# Patient Record
Sex: MALE | Race: WHITE | NOT HISPANIC OR LATINO | Employment: OTHER | URBAN - METROPOLITAN AREA
[De-identification: names, ages, dates, MRNs, and addresses within clinical notes are randomized per-mention and may not be internally consistent; named-entity substitution may affect disease eponyms.]

---

## 2023-11-07 ENCOUNTER — APPOINTMENT (OUTPATIENT)
Dept: ULTRASOUND IMAGING | Facility: CLINIC | Age: 58
End: 2023-11-07
Attending: FAMILY MEDICINE
Payer: COMMERCIAL

## 2023-11-07 ENCOUNTER — APPOINTMENT (OUTPATIENT)
Dept: CT IMAGING | Facility: CLINIC | Age: 58
End: 2023-11-07
Attending: FAMILY MEDICINE
Payer: COMMERCIAL

## 2023-11-07 ENCOUNTER — HOSPITAL ENCOUNTER (OUTPATIENT)
Facility: CLINIC | Age: 58
Setting detail: OBSERVATION
Discharge: HOME | End: 2023-11-08
Attending: FAMILY MEDICINE | Admitting: STUDENT IN AN ORGANIZED HEALTH CARE EDUCATION/TRAINING PROGRAM
Payer: COMMERCIAL

## 2023-11-07 DIAGNOSIS — I26.99 PULMONARY INFARCTION (H): ICD-10-CM

## 2023-11-07 DIAGNOSIS — Z11.52 ENCOUNTER FOR SCREENING FOR COVID-19: Primary | ICD-10-CM

## 2023-11-07 DIAGNOSIS — I26.99 BILATERAL PULMONARY EMBOLISM (H): ICD-10-CM

## 2023-11-07 PROBLEM — M54.16 LUMBAR RADICULOPATHY: Status: ACTIVE | Noted: 2017-11-15

## 2023-11-07 PROBLEM — K63.5 POLYP OF COLON: Status: ACTIVE | Noted: 2022-06-13

## 2023-11-07 PROBLEM — E78.00 HYPERCHOLESTEREMIA: Status: ACTIVE | Noted: 2023-11-07

## 2023-11-07 PROBLEM — R73.03 PREDIABETES: Status: ACTIVE | Noted: 2018-11-06

## 2023-11-07 PROBLEM — I10 HTN (HYPERTENSION): Status: ACTIVE | Noted: 2023-11-07

## 2023-11-07 PROBLEM — K64.9 HEMORRHOIDS: Status: ACTIVE | Noted: 2022-06-13

## 2023-11-07 LAB
ALBUMIN SERPL BCG-MCNC: 3.9 G/DL (ref 3.5–5.2)
ALP SERPL-CCNC: 72 U/L (ref 40–129)
ALT SERPL W P-5'-P-CCNC: 36 U/L (ref 0–70)
ANION GAP SERPL CALCULATED.3IONS-SCNC: 11 MMOL/L (ref 7–15)
APTT PPP: 23 SECONDS (ref 22–38)
AST SERPL W P-5'-P-CCNC: 20 U/L (ref 0–45)
BASOPHILS # BLD AUTO: 0 10E3/UL (ref 0–0.2)
BASOPHILS NFR BLD AUTO: 0 %
BILIRUB SERPL-MCNC: 0.6 MG/DL
BUN SERPL-MCNC: 20.5 MG/DL (ref 8–23)
CALCIUM SERPL-MCNC: 9.6 MG/DL (ref 8.6–10)
CHLORIDE SERPL-SCNC: 100 MMOL/L (ref 98–107)
CREAT SERPL-MCNC: 1.32 MG/DL (ref 0.67–1.17)
DEPRECATED HCO3 PLAS-SCNC: 24 MMOL/L (ref 22–29)
EGFRCR SERPLBLD CKD-EPI 2021: 62 ML/MIN/1.73M2
EOSINOPHIL # BLD AUTO: 0.1 10E3/UL (ref 0–0.7)
EOSINOPHIL NFR BLD AUTO: 1 %
ERYTHROCYTE [DISTWIDTH] IN BLOOD BY AUTOMATED COUNT: 11.9 % (ref 10–15)
FLUAV RNA SPEC QL NAA+PROBE: NEGATIVE
FLUBV RNA RESP QL NAA+PROBE: NEGATIVE
GLUCOSE SERPL-MCNC: 104 MG/DL (ref 70–99)
HCT VFR BLD AUTO: 42.8 % (ref 40–53)
HGB BLD-MCNC: 14.5 G/DL (ref 13.3–17.7)
HOLD SPECIMEN: NORMAL
IMM GRANULOCYTES # BLD: 0 10E3/UL
IMM GRANULOCYTES NFR BLD: 0 %
INR PPP: 1.13 (ref 0.85–1.15)
LYMPHOCYTES # BLD AUTO: 1.1 10E3/UL (ref 0.8–5.3)
LYMPHOCYTES NFR BLD AUTO: 13 %
MCH RBC QN AUTO: 31.3 PG (ref 26.5–33)
MCHC RBC AUTO-ENTMCNC: 33.9 G/DL (ref 31.5–36.5)
MCV RBC AUTO: 92 FL (ref 78–100)
MONOCYTES # BLD AUTO: 1.2 10E3/UL (ref 0–1.3)
MONOCYTES NFR BLD AUTO: 14 %
NEUTROPHILS # BLD AUTO: 6.6 10E3/UL (ref 1.6–8.3)
NEUTROPHILS NFR BLD AUTO: 72 %
NRBC # BLD AUTO: 0 10E3/UL
NRBC BLD AUTO-RTO: 0 /100
PLATELET # BLD AUTO: 207 10E3/UL (ref 150–450)
POTASSIUM SERPL-SCNC: 4.4 MMOL/L (ref 3.4–5.3)
PROT SERPL-MCNC: 7.8 G/DL (ref 6.4–8.3)
RBC # BLD AUTO: 4.64 10E6/UL (ref 4.4–5.9)
RSV RNA SPEC NAA+PROBE: NEGATIVE
SARS-COV-2 RNA RESP QL NAA+PROBE: NEGATIVE
SODIUM SERPL-SCNC: 135 MMOL/L (ref 135–145)
TROPONIN T SERPL HS-MCNC: 8 NG/L
WBC # BLD AUTO: 9.1 10E3/UL (ref 4–11)

## 2023-11-07 PROCEDURE — 85730 THROMBOPLASTIN TIME PARTIAL: CPT | Performed by: FAMILY MEDICINE

## 2023-11-07 PROCEDURE — 85025 COMPLETE CBC W/AUTO DIFF WBC: CPT | Performed by: FAMILY MEDICINE

## 2023-11-07 PROCEDURE — G0378 HOSPITAL OBSERVATION PER HR: HCPCS

## 2023-11-07 PROCEDURE — 85610 PROTHROMBIN TIME: CPT | Performed by: FAMILY MEDICINE

## 2023-11-07 PROCEDURE — 96360 HYDRATION IV INFUSION INIT: CPT

## 2023-11-07 PROCEDURE — 84484 ASSAY OF TROPONIN QUANT: CPT | Performed by: FAMILY MEDICINE

## 2023-11-07 PROCEDURE — 250N000013 HC RX MED GY IP 250 OP 250 PS 637: Performed by: FAMILY MEDICINE

## 2023-11-07 PROCEDURE — 258N000003 HC RX IP 258 OP 636: Performed by: STUDENT IN AN ORGANIZED HEALTH CARE EDUCATION/TRAINING PROGRAM

## 2023-11-07 PROCEDURE — 93970 EXTREMITY STUDY: CPT

## 2023-11-07 PROCEDURE — 93010 ELECTROCARDIOGRAM REPORT: CPT | Performed by: FAMILY MEDICINE

## 2023-11-07 PROCEDURE — 36415 COLL VENOUS BLD VENIPUNCTURE: CPT | Performed by: FAMILY MEDICINE

## 2023-11-07 PROCEDURE — 99283 EMERGENCY DEPT VISIT LOW MDM: CPT | Mod: 25 | Performed by: FAMILY MEDICINE

## 2023-11-07 PROCEDURE — 87637 SARSCOV2&INF A&B&RSV AMP PRB: CPT | Performed by: FAMILY MEDICINE

## 2023-11-07 PROCEDURE — 99285 EMERGENCY DEPT VISIT HI MDM: CPT | Mod: 25 | Performed by: FAMILY MEDICINE

## 2023-11-07 PROCEDURE — 99222 1ST HOSP IP/OBS MODERATE 55: CPT | Performed by: STUDENT IN AN ORGANIZED HEALTH CARE EDUCATION/TRAINING PROGRAM

## 2023-11-07 PROCEDURE — 250N000011 HC RX IP 250 OP 636: Performed by: FAMILY MEDICINE

## 2023-11-07 PROCEDURE — 93005 ELECTROCARDIOGRAM TRACING: CPT | Performed by: FAMILY MEDICINE

## 2023-11-07 PROCEDURE — 250N000009 HC RX 250: Performed by: FAMILY MEDICINE

## 2023-11-07 PROCEDURE — 96361 HYDRATE IV INFUSION ADD-ON: CPT

## 2023-11-07 PROCEDURE — 71275 CT ANGIOGRAPHY CHEST: CPT

## 2023-11-07 PROCEDURE — 80053 COMPREHEN METABOLIC PANEL: CPT | Performed by: FAMILY MEDICINE

## 2023-11-07 RX ORDER — ONDANSETRON 4 MG/1
4 TABLET, ORALLY DISINTEGRATING ORAL EVERY 6 HOURS PRN
Status: DISCONTINUED | OUTPATIENT
Start: 2023-11-07 | End: 2023-11-07

## 2023-11-07 RX ORDER — HYDROMORPHONE HYDROCHLORIDE 1 MG/ML
.3-.5 INJECTION, SOLUTION INTRAMUSCULAR; INTRAVENOUS; SUBCUTANEOUS
Status: DISCONTINUED | OUTPATIENT
Start: 2023-11-07 | End: 2023-11-08 | Stop reason: HOSPADM

## 2023-11-07 RX ORDER — SODIUM CHLORIDE 9 MG/ML
INJECTION, SOLUTION INTRAVENOUS CONTINUOUS
Status: DISCONTINUED | OUTPATIENT
Start: 2023-11-07 | End: 2023-11-07

## 2023-11-07 RX ORDER — NALOXONE HYDROCHLORIDE 0.4 MG/ML
0.4 INJECTION, SOLUTION INTRAMUSCULAR; INTRAVENOUS; SUBCUTANEOUS
Status: DISCONTINUED | OUTPATIENT
Start: 2023-11-07 | End: 2023-11-08 | Stop reason: HOSPADM

## 2023-11-07 RX ORDER — ACETAMINOPHEN 325 MG/1
650 TABLET ORAL EVERY 6 HOURS PRN
Status: DISCONTINUED | OUTPATIENT
Start: 2023-11-07 | End: 2023-11-08 | Stop reason: HOSPADM

## 2023-11-07 RX ORDER — NALOXONE HYDROCHLORIDE 0.4 MG/ML
0.4 INJECTION, SOLUTION INTRAMUSCULAR; INTRAVENOUS; SUBCUTANEOUS
Status: DISCONTINUED | OUTPATIENT
Start: 2023-11-07 | End: 2023-11-07

## 2023-11-07 RX ORDER — ONDANSETRON 2 MG/ML
4 INJECTION INTRAMUSCULAR; INTRAVENOUS EVERY 6 HOURS PRN
Status: DISCONTINUED | OUTPATIENT
Start: 2023-11-07 | End: 2023-11-07

## 2023-11-07 RX ORDER — MULTIVITAMIN
1 TABLET ORAL DAILY
COMMUNITY
Start: 2022-03-03

## 2023-11-07 RX ORDER — NALOXONE HYDROCHLORIDE 0.4 MG/ML
0.2 INJECTION, SOLUTION INTRAMUSCULAR; INTRAVENOUS; SUBCUTANEOUS
Status: DISCONTINUED | OUTPATIENT
Start: 2023-11-07 | End: 2023-11-07

## 2023-11-07 RX ORDER — SODIUM CHLORIDE, SODIUM LACTATE, POTASSIUM CHLORIDE, CALCIUM CHLORIDE 600; 310; 30; 20 MG/100ML; MG/100ML; MG/100ML; MG/100ML
INJECTION, SOLUTION INTRAVENOUS CONTINUOUS
Status: DISCONTINUED | OUTPATIENT
Start: 2023-11-07 | End: 2023-11-08

## 2023-11-07 RX ORDER — CYCLOBENZAPRINE HCL 10 MG
10 TABLET ORAL 3 TIMES DAILY PRN
COMMUNITY
Start: 2022-03-03

## 2023-11-07 RX ORDER — ONDANSETRON 2 MG/ML
4 INJECTION INTRAMUSCULAR; INTRAVENOUS EVERY 6 HOURS PRN
Status: DISCONTINUED | OUTPATIENT
Start: 2023-11-07 | End: 2023-11-08 | Stop reason: HOSPADM

## 2023-11-07 RX ORDER — OXYCODONE HYDROCHLORIDE 5 MG/1
5 TABLET ORAL
Status: DISCONTINUED | OUTPATIENT
Start: 2023-11-07 | End: 2023-11-08 | Stop reason: HOSPADM

## 2023-11-07 RX ORDER — LISINOPRIL 20 MG/1
1 TABLET ORAL DAILY
COMMUNITY
Start: 2022-06-13

## 2023-11-07 RX ORDER — OXYCODONE HYDROCHLORIDE 5 MG/1
5-10 TABLET ORAL
Status: DISCONTINUED | OUTPATIENT
Start: 2023-11-07 | End: 2023-11-07

## 2023-11-07 RX ORDER — IOPAMIDOL 755 MG/ML
90 INJECTION, SOLUTION INTRAVASCULAR ONCE
Status: COMPLETED | OUTPATIENT
Start: 2023-11-07 | End: 2023-11-07

## 2023-11-07 RX ORDER — NALOXONE HYDROCHLORIDE 0.4 MG/ML
0.2 INJECTION, SOLUTION INTRAMUSCULAR; INTRAVENOUS; SUBCUTANEOUS
Status: DISCONTINUED | OUTPATIENT
Start: 2023-11-07 | End: 2023-11-08 | Stop reason: HOSPADM

## 2023-11-07 RX ORDER — ACETAMINOPHEN 325 MG/1
650 TABLET ORAL EVERY 4 HOURS PRN
Status: DISCONTINUED | OUTPATIENT
Start: 2023-11-07 | End: 2023-11-07

## 2023-11-07 RX ORDER — ONDANSETRON 4 MG/1
4 TABLET, ORALLY DISINTEGRATING ORAL EVERY 6 HOURS PRN
Status: DISCONTINUED | OUTPATIENT
Start: 2023-11-07 | End: 2023-11-08 | Stop reason: HOSPADM

## 2023-11-07 RX ADMIN — SODIUM CHLORIDE 100 ML: 9 INJECTION, SOLUTION INTRAVENOUS at 17:27

## 2023-11-07 RX ADMIN — IOPAMIDOL 90 ML: 755 INJECTION, SOLUTION INTRAVENOUS at 17:27

## 2023-11-07 RX ADMIN — SODIUM CHLORIDE, POTASSIUM CHLORIDE, SODIUM LACTATE AND CALCIUM CHLORIDE: 600; 310; 30; 20 INJECTION, SOLUTION INTRAVENOUS at 21:39

## 2023-11-07 RX ADMIN — APIXABAN 10 MG: 5 TABLET, FILM COATED ORAL at 18:50

## 2023-11-07 ASSESSMENT — ACTIVITIES OF DAILY LIVING (ADL)
ADLS_ACUITY_SCORE: 22
ADLS_ACUITY_SCORE: 35
ADLS_ACUITY_SCORE: 22
ADLS_ACUITY_SCORE: 35

## 2023-11-07 NOTE — ED TRIAGE NOTES
Right back/shoulder pain, started Sat, improved with heating pad, slight cough, coughing makes the pain worse, did cough up small amount of blood; did have wisdom tooth removed Sat.      Triage Assessment (Adult)       Row Name 11/07/23 1247          Triage Assessment    Airway WDL WDL        Cardiac WDL    Cardiac WDL X  right shoulder pain        Cognitive/Neuro/Behavioral WDL    Cognitive/Neuro/Behavioral WDL WDL

## 2023-11-07 NOTE — ED PROVIDER NOTES
History     Chief Complaint   Patient presents with    Shoulder Pain     Right back/shoulder pain, started Sat, improved with heating pad, slight cough, coughing makes the pain worse, did cough up small amount of blood; did have wisdom tooth removed Sat.      HPI  Chano Mott is a 58 year old male, past medical history significant for hypertension, hypercholesterolemia, prediabetes, lumbar radiculopathy, ankylosing spondylitis, presents to the emergency department with concerns of of hemoptysis as well as right periscapular pain and shortness of breath.  History is obtained from the patient presents with his brother.  Last week on Thursday the patient had extraction of a left maxillary molar.  He had a little bit of bleeding around that time but things were quiescent over the weekend and then over the course of late yesterday and today he had some coughing where he brought up a small quantity of blood.  The tooth area did not appear to be bleeding by his own evaluation at home looking in the mirror.  He also notes approximately 48 to 72 hours of right periscapular pain which is sharp worsens with movement but can recall no injury despite the fact that he was working outdoors over the weekend.  Slight cough, nonproductive.  Coughed up a small amount of blood-streaked sputum and feels much more short of breath than usual.  His brother points out that he typically walks 2 miles per day without being short of breath so this is something significant and they are paying attention to it.  Google search indicated some potentially serious problems like pulmonary emboli and so he came to the emergency department.  Patient does have a appointment with primary care provider this coming Friday but when they reviewed his planned visit they felt that he should probably be seen earlier sent him to the emergency department.      Allergies:  Allergies   Allergen Reactions    Pravastatin Headache       Problem List:   "  Patient Active Problem List    Diagnosis Date Noted    HTN (hypertension) 11/07/2023     Priority: Medium    Hypercholesteremia 11/07/2023     Priority: Medium    Pulmonary infarction (H) 11/07/2023     Priority: Medium    Bilateral pulmonary embolism (H) 11/07/2023     Priority: Medium    Polyp of colon 06/13/2022     Priority: Medium    Hemorrhoids 06/13/2022     Priority: Medium    Prediabetes 11/06/2018     Priority: Medium    Lumbar radiculopathy 11/15/2017     Priority: Medium     Formatting of this note might be different from the original. Lumbar radiculopathy left L 5 11-17      Ankylosing spondylitis (H) 01/23/2014     Priority: Medium     Formatting of this note might be different from the original. CURRENT MEDICATION(S) none  No longer seeing rheum.          Past Medical History:    No past medical history on file.    Past Surgical History:    No past surgical history on file.    Family History:    No family history on file.    Social History:  Marital Status:  Single [1]        Medications:    No current outpatient medications on file.        Review of Systems   All other systems reviewed and are negative.      Physical Exam   BP: 121/80  Pulse: (!) 125  Temp: 97.8  F (36.6  C)  Resp: 20  Height: 185.4 cm (6' 1\")  Weight: 100.7 kg (222 lb)  SpO2: 96 %      Physical Exam  Vitals and nursing note reviewed.   Constitutional:       General: He is not in acute distress.     Appearance: Normal appearance. He is normal weight. He is not ill-appearing.   HENT:      Head: Normocephalic and atraumatic.      Right Ear: Tympanic membrane normal.      Left Ear: Tympanic membrane normal.      Nose: Nose normal.      Mouth/Throat:      Mouth: Mucous membranes are dry.      Pharynx: Oropharynx is clear.      Comments: Dry extraction bed approximately tooth #14 area first molar.  Eyes:      Extraocular Movements: Extraocular movements intact.      Conjunctiva/sclera: Conjunctivae normal.      Pupils: Pupils are equal, " round, and reactive to light.   Cardiovascular:      Rate and Rhythm: Normal rate and regular rhythm.      Pulses: Normal pulses.      Heart sounds: Normal heart sounds.   Pulmonary:      Effort: Pulmonary effort is normal.      Breath sounds: Normal breath sounds.   Abdominal:      General: Bowel sounds are normal.      Palpations: Abdomen is soft.   Musculoskeletal:         General: Normal range of motion.      Cervical back: Normal range of motion and neck supple.   Skin:     General: Skin is warm and dry.      Capillary Refill: Capillary refill takes less than 2 seconds.   Neurological:      General: No focal deficit present.      Mental Status: He is alert and oriented to person, place, and time.   Psychiatric:         Mood and Affect: Mood normal.         Behavior: Behavior normal.         ED Course        4:17 PM  Patient was seen and evaluated and course of care discussed.  Certainly differential diagnostic considerations especially with the patient's abnormal vital sign of borderline tachycardia at rest in conjunction with his symptoms historically would warrant consideration for pulmonary emboli.  Clinically stable without hypoxia on room air not tachypneic but borderline tachycardia.  EKG revealed only sinus tachycardia without definite acute ischemia or infarct.  Lab diagnostics are pending.  After discussion with the patient and review of appropriate differential diagnostic considerations I recommended obtaining a CT scan PE study for evaluation of possible pulmonary emboli.  The patient is in agreement.         Procedures              EKG Interpretation:      Interpreted by Stanley Hedrick MD  Time reviewed: Time obtained 1253 time interpreted same sinus tachycardia 109 bpm no acute ST-T wave changes.  Normal axis, normal intervals.  Impression sinus tachycardia.         Results for orders placed or performed during the hospital encounter of 11/07/23 (from the past 24 hour(s))   CBC with  platelets, differential    Narrative    The following orders were created for panel order CBC with platelets, differential.  Procedure                               Abnormality         Status                     ---------                               -----------         ------                     CBC with platelets and d...[[id number]]                      Final result                 Please view results for these tests on the individual orders.   Comprehensive metabolic panel   Result Value Ref Range    Sodium 135 135 - 145 mmol/L    Potassium 4.4 3.4 - 5.3 mmol/L    Carbon Dioxide (CO2) 24 22 - 29 mmol/L    Anion Gap 11 7 - 15 mmol/L    Urea Nitrogen 20.5 8.0 - 23.0 mg/dL    Creatinine 1.32 (H) 0.67 - 1.17 mg/dL    GFR Estimate 62 >60 mL/min/1.73m2    Calcium 9.6 8.6 - 10.0 mg/dL    Chloride 100 98 - 107 mmol/L    Glucose 104 (H) 70 - 99 mg/dL    Alkaline Phosphatase 72 40 - 129 U/L    AST 20 0 - 45 U/L    ALT 36 0 - 70 U/L    Protein Total 7.8 6.4 - 8.3 g/dL    Albumin 3.9 3.5 - 5.2 g/dL    Bilirubin Total 0.6 <=1.2 mg/dL   Troponin T, High Sensitivity   Result Value Ref Range    Troponin T, High Sensitivity 8 <=22 ng/L   [facility]    Narrative    The following orders were created for panel order [facility].  Procedure                               Abnormality         Status                     ---------                               -----------         ------                     Extra Blue Top Tube[[id number]]                              Final result                 Please view results for these tests on the individual orders.   CBC with platelets and differential   Result Value Ref Range    WBC Count 9.1 4.0 - 11.0 10e3/uL    RBC Count 4.64 4.40 - 5.90 10e6/uL    Hemoglobin 14.5 13.3 - 17.7 g/dL    Hematocrit 42.8 40.0 - 53.0 %    MCV 92 78 - 100 fL    MCH 31.3 26.5 - 33.0 pg    MCHC 33.9 31.5 - 36.5 g/dL    RDW 11.9 10.0 - 15.0 %    Platelet Count 207 150 - 450 10e3/uL    % Neutrophils 72 %    %  Lymphocytes 13 %    % Monocytes 14 %    % Eosinophils 1 %    % Basophils 0 %    % Immature Granulocytes 0 %    NRBCs per 100 WBC 0 <1 /100    Absolute Neutrophils 6.6 1.6 - 8.3 10e3/uL    Absolute Lymphocytes 1.1 0.8 - 5.3 10e3/uL    Absolute Monocytes 1.2 0.0 - 1.3 10e3/uL    Absolute Eosinophils 0.1 0.0 - 0.7 10e3/uL    Absolute Basophils 0.0 0.0 - 0.2 10e3/uL    Absolute Immature Granulocytes 0.0 <=0.4 10e3/uL    Absolute NRBCs 0.0 10e3/uL   Extra Blue Top Tube   Result Value Ref Range    Hold Specimen JIC    INR   Result Value Ref Range    INR 1.13 0.85 - 1.15   CT Chest Pulmonary Embolism w Contrast    Narrative    EXAM: CT CHEST PULMONARY EMBOLISM W CONTRAST  LOCATION: [facility]  DATE: 11/7/2023    INDICATION: Pain. Shortness of breath, right periscapular pain, recent surgical procedure, rule out pulmonary embolism.  COMPARISON: None.  TECHNIQUE: CT chest pulmonary angiogram during arterial phase injection of IV contrast. Multiplanar reformats and MIP reconstructions were performed. Dose reduction techniques were used.   CONTRAST: 93 ml Isovue 370    FINDINGS:  ANGIOGRAM CHEST: Multiple bilateral pulmonary emboli with occlusive embolus within the posterior right upper lobe with large evolving infarct. This also infarcts in the inferior left upper lobe. Multiple emboli in the right lower lobe, including the   right lower lobe pulmonary artery. No right heart strain. Thoracic aorta negative for dissection or aneurysm.    LUNGS AND PLEURA: Large subpleural ground-glass infiltrate right upper lobe compatible with evolving infarct. Ground-glass and solid subpleural infiltrate in the inferomedial left upper lobe compatible with infarct. Tiny effusions. Elevation right   hemidiaphragm.    MEDIASTINUM/AXILLAE: Moderate.    CORONARY ARTERY CALCIFICATION: Moderate.    UPPER ABDOMEN: A few visualized simple cysts left kidney which do not warrant follow-up.    MUSCULOSKELETAL: Normal.       Impression    IMPRESSION:  1.  Acute bilateral pulmonary embolism. Occlusive embolism within the segmental pulmonary arterial branch in the right upper lobe with large evolving infarct within the posterior right upper lobe. There is also evidence of embolism within a subsegmental   pulmonary arterial branch within the inferior left upper lobe with smaller evolving subpleural infarct. Multiple emboli within the right lower lobe including the right lower lobe pulmonary artery. Small amount of embolism seen in the right upper lobe   pulmonary artery extends into the right pulmonary artery.    2.  Results discussed with Dr. Stanley Hedrick.    NOTE: ABNORMAL REPORT    1.  THE DICTATION ABOVE DESCRIBES AN ABNORMALITY FOR WHICH FOLLOW-UP IS NEEDED.    6:07 PM  Reviewed lab diagnostics and imaging in the room with the patient and his brother.  Extensive bilateral PE worse on the right than the left.  No right heart strain noted per radiology.  The patient remains borderline tachycardic but is otherwise vitally stable and not requiring oxygen at this point.  I discussed possible disposition options with he and his brother.  I would like to have ultrasound of bilateral lower extremities to assess for clot burden and I also recommended preferentially that the patient be admitted to observation status.  He reluctantly agrees and I plan to discuss the patient with the on-call hospitalist.    6:26 PM  I reviewed this patient with on-call hospitalist Dr. Jordan Shoemaker who agreed to accept the care of the patient to observation status.  DOAC, apixaban.  Agrees with bilateral ultrasound lower extremities for evaluation of clot burden.  Transition orders placed.    Medications   sodium chloride 0.9% BOLUS 1,000 mL (0 mLs Intravenous Hold 11/7/23 1800)   apixaban ANTICOAGULANT (ELIQUIS) tablet 10 mg (has no administration in time range)     Followed by   apixaban ANTICOAGULANT (ELIQUIS) tablet 5 mg (has no administration in time range)    iopamidol (ISOVUE-370) solution 90 mL (90 mLs Intravenous $Given 11/7/23 1727)   sodium chloride 0.9 % bag 500mL for CT scan flush use (100 mLs Intravenous $Given 11/7/23 1727)       Assessments & Plan (with Medical Decision Making)   58-year-old male past medical history reviewed as above who presents after dental procedure and subsequent development of right periscapular pain and shortness of breath as discussed in the HPI documented on exam.  The patient has resting tachycardia but otherwise normal vital signs including oxygen saturation and respiratory rate at rest.  Parenteral diagnostic consideration certainly includes the possibility of pulmonary emboli in addition to musculoskeletal etiology, atypical pneumonia, pneumothorax, thoracic dissection, ACS, GI etiology pain.  CT scan of the patient's chest revealed acute extensive bilateral pulmonary embolism without evidence of right heart strain.  Pulmonary infarction present.  Still pending at the time of this dictation are bilateral lower extremity ultrasound and COVID/RSV/influenza testing.  The patient is reviewed as noted the time stamp above with the on-call hospitalist will plan on placing the patient on oral DOAC therapy.  Disclaimer: This note consists of symbols derived from keyboarding, dictation and/or voice recognition software. As a result, there may be errors in the script that have gone undetected. Please consider this when interpreting information found in this chart.      I have reviewed the nursing notes.    I have reviewed the findings, diagnosis, plan and need for follow up with the patient.        New Prescriptions    No medications on file       Final diagnoses:   Bilateral pulmonary embolism (H)   Pulmonary infarction (H)       11/7/2023   [facility] EMERGENCY DEPT       Stanley Hedrick MD  11/07/23 0454

## 2023-11-08 ENCOUNTER — APPOINTMENT (OUTPATIENT)
Dept: CARDIOLOGY | Facility: CLINIC | Age: 58
End: 2023-11-08
Attending: STUDENT IN AN ORGANIZED HEALTH CARE EDUCATION/TRAINING PROGRAM
Payer: COMMERCIAL

## 2023-11-08 VITALS
BODY MASS INDEX: 29.01 KG/M2 | TEMPERATURE: 99.9 F | HEART RATE: 105 BPM | HEIGHT: 73 IN | WEIGHT: 218.92 LBS | RESPIRATION RATE: 16 BRPM | SYSTOLIC BLOOD PRESSURE: 124 MMHG | OXYGEN SATURATION: 92 % | DIASTOLIC BLOOD PRESSURE: 78 MMHG

## 2023-11-08 LAB
ANION GAP SERPL CALCULATED.3IONS-SCNC: 11 MMOL/L (ref 7–15)
BUN SERPL-MCNC: 19 MG/DL (ref 8–23)
CALCIUM SERPL-MCNC: 9.2 MG/DL (ref 8.6–10)
CHLORIDE SERPL-SCNC: 100 MMOL/L (ref 98–107)
CREAT SERPL-MCNC: 0.98 MG/DL (ref 0.67–1.17)
DEPRECATED HCO3 PLAS-SCNC: 21 MMOL/L (ref 22–29)
EGFRCR SERPLBLD CKD-EPI 2021: 89 ML/MIN/1.73M2
ERYTHROCYTE [DISTWIDTH] IN BLOOD BY AUTOMATED COUNT: 11.9 % (ref 10–15)
GLUCOSE SERPL-MCNC: 99 MG/DL (ref 70–99)
HCT VFR BLD AUTO: 40.4 % (ref 40–53)
HGB BLD-MCNC: 13.4 G/DL (ref 13.3–17.7)
LVEF ECHO: NORMAL
MCH RBC QN AUTO: 30.8 PG (ref 26.5–33)
MCHC RBC AUTO-ENTMCNC: 33.2 G/DL (ref 31.5–36.5)
MCV RBC AUTO: 93 FL (ref 78–100)
PLATELET # BLD AUTO: 215 10E3/UL (ref 150–450)
POTASSIUM SERPL-SCNC: 4.1 MMOL/L (ref 3.4–5.3)
RBC # BLD AUTO: 4.35 10E6/UL (ref 4.4–5.9)
SODIUM SERPL-SCNC: 132 MMOL/L (ref 135–145)
WBC # BLD AUTO: 9.3 10E3/UL (ref 4–11)

## 2023-11-08 PROCEDURE — 258N000003 HC RX IP 258 OP 636: Performed by: STUDENT IN AN ORGANIZED HEALTH CARE EDUCATION/TRAINING PROGRAM

## 2023-11-08 PROCEDURE — 999N000208 ECHOCARDIOGRAM COMPLETE

## 2023-11-08 PROCEDURE — 99239 HOSP IP/OBS DSCHRG MGMT >30: CPT | Performed by: HOSPITALIST

## 2023-11-08 PROCEDURE — 36415 COLL VENOUS BLD VENIPUNCTURE: CPT | Performed by: STUDENT IN AN ORGANIZED HEALTH CARE EDUCATION/TRAINING PROGRAM

## 2023-11-08 PROCEDURE — 93306 TTE W/DOPPLER COMPLETE: CPT | Mod: 26 | Performed by: INTERNAL MEDICINE

## 2023-11-08 PROCEDURE — 96361 HYDRATE IV INFUSION ADD-ON: CPT

## 2023-11-08 PROCEDURE — 80048 BASIC METABOLIC PNL TOTAL CA: CPT | Performed by: STUDENT IN AN ORGANIZED HEALTH CARE EDUCATION/TRAINING PROGRAM

## 2023-11-08 PROCEDURE — G0378 HOSPITAL OBSERVATION PER HR: HCPCS

## 2023-11-08 PROCEDURE — 85027 COMPLETE CBC AUTOMATED: CPT | Performed by: STUDENT IN AN ORGANIZED HEALTH CARE EDUCATION/TRAINING PROGRAM

## 2023-11-08 PROCEDURE — 255N000002 HC RX 255 OP 636: Performed by: HOSPITALIST

## 2023-11-08 PROCEDURE — 250N000013 HC RX MED GY IP 250 OP 250 PS 637: Performed by: STUDENT IN AN ORGANIZED HEALTH CARE EDUCATION/TRAINING PROGRAM

## 2023-11-08 RX ADMIN — APIXABAN 10 MG: 5 TABLET, FILM COATED ORAL at 08:06

## 2023-11-08 RX ADMIN — HUMAN ALBUMIN MICROSPHERES AND PERFLUTREN 2 ML: 10; .22 INJECTION, SOLUTION INTRAVENOUS at 14:19

## 2023-11-08 RX ADMIN — SODIUM CHLORIDE, POTASSIUM CHLORIDE, SODIUM LACTATE AND CALCIUM CHLORIDE: 600; 310; 30; 20 INJECTION, SOLUTION INTRAVENOUS at 08:07

## 2023-11-08 ASSESSMENT — ACTIVITIES OF DAILY LIVING (ADL)
ADLS_ACUITY_SCORE: 22

## 2023-11-08 NOTE — DISCHARGE INSTRUCTIONS
Apixaban (By mouth)  Apixaban (a-PIX-a-ban)    Treats and prevents blood clots. This medicine is a blood thinner.    Brand Name(s):Eliquis,Eliquis 30 Day DVT/PE Starter Pack  There may be other brand names for this medicine.    When This Medicine Should Not Be Used:  This medicine is not right for everyone. Do not use it if you had an allergic reaction to apixaban or you have active bleeding.    How to Use This Medicine:  Tablet  Your doctor will tell you how much medicine to use. Do not use more than directed.   If you are not able to swallow the tablets whole, they may be crushed and mixed in water, 5% dextrose in water (D5W), apple juice, or applesauce. The crushed tablets may be mixed with 60 mL of water or D5W dose and given through a nasogastric tube (NGT).  This medicine should come with a Medication Guide. Ask your pharmacist for a copy if you do not have one.   Missed dose: Take a dose as soon as you remember. If it is almost time for your next dose, wait until then and take a regular dose. Do not take extra medicine to make up for a missed dose.   Store the medicine in a closed container at room temperature, away from heat, moisture, and direct light.     Drugs and Foods to Avoid:  Ask your doctor or pharmacist before using any other medicine, including over-the-counter medicines, vitamins, and herbal products.  Some medicines can affect how apixaban works. Tell your doctor if you are using any of the following:   Carbamazepine, itraconazole, ketoconazole, phenytoin, rifampin, ritonavir, Ghada's wort  Blood thinner (including clopidogrel, heparin, prasugrel, warfarin)  Medicine to treat depression  NSAID pain or arthritis medicine (including aspirin, celecoxib, diclofenac, ibuprofen, naproxen)    Warnings While Using This Medicine:  Tell your doctor if you are pregnant or breastfeeding, or if you have kidney disease, liver disease, bleeding problems, antiphospholipid syndrome, or an artificial heart  valve.  Do not stop using this medicine suddenly without asking your doctor. You might have a higher risk of stroke for a short time after you stop using this medicine.  This medicine increases your risk for bleeding that can become serious if not controlled. You may also bruise easily, and it may take longer than usual for bleeding to stop.  This medicine may increase your risk for a hematoma (blood clot) in your spine or back if you undergo an epidural or spinal puncture. This could lead to paralysis. Tell your doctor if you ever had spine problems or back surgery.  Tell any doctor or dentist who treats you that you are using this medicine. With your doctor's supervision, you may need to stop using this medicine several days before you have surgery or medical tests.  Your doctor will do lab tests at regular visits to check on the effects of this medicine. Keep all appointments.   Keep all medicine out of the reach of children. Never share your medicine with anyone.     Possible Side Effects While Using This Medicine:  Call your doctor right away if you notice any of these side effects:  Allergic reaction: Itching or hives, swelling in your face or hands, swelling or tingling in your mouth or throat, chest tightness, trouble breathing  Change in how much or how often you urinate, red or pink urine  Chest pain, trouble breathing  Coughing up blood, vomiting blood or material that looks like coffee grounds  Numbness, tingling, or muscle weakness in your legs or feet  Red or black, tarry stools  Unusual bleeding, bruising, or weakness    If you notice other side effects that you think are caused by this medicine, tell your doctor.  Call your doctor for medical advice about side effects. You may report side effects to FDA at 4-036-FDA-1433

## 2023-11-08 NOTE — PROGRESS NOTES
[facility] DISCHARGE NOTE    Patient discharged to home at 4:08 PM via wheel chair. Accompanied by brother and staff. Discharge instructions reviewed with patient and sibling, opportunity offered to ask questions. Prescriptions sent to patients preferred pharmacy. All belongings sent with patient.    Ivan Vanessa RN

## 2023-11-08 NOTE — DISCHARGE SUMMARY
Internal Medicine Discharge Summary     Name: Chano Mott  YOB: 1965 (Age: 58 year old)  [facility]  Admitting physician: Jordan Shoemaker DO   Discharging Physician: Valencia Han MD   Date of Admission: 11/7/2023  Date of Discharge: 11/8/2023     Primary Discharge Diagnosis:      Acute unprovoked b/l pulmonary embolism and LLE DVT    HPI (per admitting physician):      Chano Mott is a 58 year old male who has past medical history of hypertension, hyperlipidemia, lumbar radiculopathy, and ankylosing spondylitis who presents with new onset hemoptysis, shortness of breath, and right shoulder pain.  He recently underwent a molar extraction 11/2 which healed without any concerns, but then patient noticed that he was still coughing up blood which prompted him to be concerned.  Patient also noted that he had right scapular pain which was sharp and worse with exertion.  Patient noted that he started to have a cough that made the pain worse and after quick Google search decided that it was time to be evaluated in the ED.  Of note, patient is fairly active at baseline without any comorbidities.  Patient became more concerned when he was short of breath, because this is brand-new and he typically has pretty good exercise tolerance.  He denies any smoking, call, or illicit drug use.      Hospital Course by Main Problems:      Acute, unprovoked b/l pulmonary embolism, LLE DVT: Patient p/w SOB, right shoulder pain and small volume hemoptysis. CTA chest showed acute b/l pulmonary embolism, occlusive embolism within the segmental pulmonary arterial branch in the right upper lobe with large evolving infarct within the posterior right upper lobe, e/o subsegmental pulmonary arterial branch within the inferior left upper lobe with smaller evolving subpleural infarct, multiple emboli within the right lower lobe including the right lower lobe pulmonary artery, small amount of  "embolism seen in the right upper lobe pulmonary artery extends into the right pulmonary artery. PVL b/l LE showed a partially occlusive DVT in the left popliteal vein. Echo showed preserved EF, no WMA, normal RV size and function, and no hemodynamically significant valvular disease. EKG showed sinus tachycardia with non-specific TW changes. Troponin was negative. COVID-19, RSV, influenza neg. Patient denied recent surgery (apart from dental extraction which would not qualify patient's thromboembolism as provoked) or long haul travel, and he denied personal or family h/o thromboembolism. The patient was started on eliquis on admission and he will need a minimum of 3 month course of AC. He reported that he is UTD on his routine cancer screening, but was asked to confirm this at his PCP follow up appointment. The patient needs hypercoagulable labs at his PCP follow up.     Minimal hemoptysis: Patient reported coughing up very small volume of blood which is 2/2 PE as above with suspected associated pulmonary infarction. The patient had no significant hemoptysis during this admission and his H/H was WNL.     GERRY: Likely 2/2 recent NSAID use at home for shoulder pain with PTA lisinopril potentially contributing. The patient's lisinopril was held on admission and he was given IVF with normalization of his renal function.    Hyponatremia: Mild with na 132. Patient asymptomatic.    HTN: Cont lisinopril    HLD: Patient is intolerant to statins    Discharge Exam:     /78   Pulse 105   Temp 99.9  F (37.7  C) (Oral)   Resp 16   Ht 1.854 m (6' 1\")   Wt 99.3 kg (218 lb 14.7 oz)   SpO2 92%   BMI 28.88 kg/m    Gen: A+Ox3, no acute distress  HEENT: No scleral icterus, no conjunctival pallor, no JVD  CVS: RRR, clear S1S2, no murmurs  Resp: CTA all fields b/l, no wheeze, rales or rhonchi  Abdomen: Normal bowel sounds, soft, no tenderness to palpation  Extremities: No lower extremity edema  Neuology: CN II - XII grossly " intact, no focal deficits    Discharge/Recent Laboratory Results:     Results for orders placed or performed during the hospital encounter of 11/07/23 (from the past 24 hour(s))   CBC with platelets, differential    Narrative    The following orders were created for panel order CBC with platelets, differential.  Procedure                               Abnormality         Status                     ---------                               -----------         ------                     CBC with platelets and d...[[id number]]                      Final result                 Please view results for these tests on the individual orders.   Comprehensive metabolic panel   Result Value Ref Range    Sodium 135 135 - 145 mmol/L    Potassium 4.4 3.4 - 5.3 mmol/L    Carbon Dioxide (CO2) 24 22 - 29 mmol/L    Anion Gap 11 7 - 15 mmol/L    Urea Nitrogen 20.5 8.0 - 23.0 mg/dL    Creatinine 1.32 (H) 0.67 - 1.17 mg/dL    GFR Estimate 62 >60 mL/min/1.73m2    Calcium 9.6 8.6 - 10.0 mg/dL    Chloride 100 98 - 107 mmol/L    Glucose 104 (H) 70 - 99 mg/dL    Alkaline Phosphatase 72 40 - 129 U/L    AST 20 0 - 45 U/L    ALT 36 0 - 70 U/L    Protein Total 7.8 6.4 - 8.3 g/dL    Albumin 3.9 3.5 - 5.2 g/dL    Bilirubin Total 0.6 <=1.2 mg/dL   Troponin T, High Sensitivity   Result Value Ref Range    Troponin T, High Sensitivity 8 <=22 ng/L   [place] Draw    Narrative    The following orders were created for panel order [place] Draw.  Procedure                               Abnormality         Status                     ---------                               -----------         ------                     Extra Blue Top Tube[[id number]]                              Final result                 Please view results for these tests on the individual orders.   CBC with platelets and differential   Result Value Ref Range    WBC Count 9.1 4.0 - 11.0 10e3/uL    RBC Count 4.64 4.40 - 5.90 10e6/uL    Hemoglobin 14.5 13.3 - 17.7 g/dL    Hematocrit 42.8 40.0 -  53.0 %    MCV 92 78 - 100 fL    MCH 31.3 26.5 - 33.0 pg    MCHC 33.9 31.5 - 36.5 g/dL    RDW 11.9 10.0 - 15.0 %    Platelet Count 207 150 - 450 10e3/uL    % Neutrophils 72 %    % Lymphocytes 13 %    % Monocytes 14 %    % Eosinophils 1 %    % Basophils 0 %    % Immature Granulocytes 0 %    NRBCs per 100 WBC 0 <1 /100    Absolute Neutrophils 6.6 1.6 - 8.3 10e3/uL    Absolute Lymphocytes 1.1 0.8 - 5.3 10e3/uL    Absolute Monocytes 1.2 0.0 - 1.3 10e3/uL    Absolute Eosinophils 0.1 0.0 - 0.7 10e3/uL    Absolute Basophils 0.0 0.0 - 0.2 10e3/uL    Absolute Immature Granulocytes 0.0 <=0.4 10e3/uL    Absolute NRBCs 0.0 10e3/uL   Extra Blue Top Tube   Result Value Ref Range    Hold Specimen JIC    INR   Result Value Ref Range    INR 1.13 0.85 - 1.15   CT Chest Pulmonary Embolism w Contrast    Narrative    EXAM: CT CHEST PULMONARY EMBOLISM W CONTRAST  LOCATION: [facility]  DATE: 11/7/2023    INDICATION: Pain. Shortness of breath, right periscapular pain, recent surgical procedure, rule out pulmonary embolism.  COMPARISON: None.  TECHNIQUE: CT chest pulmonary angiogram during arterial phase injection of IV contrast. Multiplanar reformats and MIP reconstructions were performed. Dose reduction techniques were used.   CONTRAST: 93 ml Isovue 370    FINDINGS:  ANGIOGRAM CHEST: Multiple bilateral pulmonary emboli with occlusive embolus within the posterior right upper lobe with large evolving infarct. This also infarcts in the inferior left upper lobe. Multiple emboli in the right lower lobe, including the   right lower lobe pulmonary artery. No right heart strain. Thoracic aorta negative for dissection or aneurysm.    LUNGS AND PLEURA: Large subpleural ground-glass infiltrate right upper lobe compatible with evolving infarct. Ground-glass and solid subpleural infiltrate in the inferomedial left upper lobe compatible with infarct. Tiny effusions. Elevation right   hemidiaphragm.    MEDIASTINUM/AXILLAE:  Moderate.    CORONARY ARTERY CALCIFICATION: Moderate.    UPPER ABDOMEN: A few visualized simple cysts left kidney which do not warrant follow-up.    MUSCULOSKELETAL: Normal.      Impression    IMPRESSION:  1.  Acute bilateral pulmonary embolism. Occlusive embolism within the segmental pulmonary arterial branch in the right upper lobe with large evolving infarct within the posterior right upper lobe. There is also evidence of embolism within a subsegmental   pulmonary arterial branch within the inferior left upper lobe with smaller evolving subpleural infarct. Multiple emboli within the right lower lobe including the right lower lobe pulmonary artery. Small amount of embolism seen in the right upper lobe   pulmonary artery extends into the right pulmonary artery.    2.  Results discussed with Dr. Stanley Hedrick.    NOTE: ABNORMAL REPORT    1.  THE DICTATION ABOVE DESCRIBES AN ABNORMALITY FOR WHICH FOLLOW-UP IS NEEDED.    PTT   Result Value Ref Range    aPTT 23 22 - 38 Seconds   Symptomatic Influenza A/B, RSV, & SARS-CoV2 PCR (COVID-19) Nasopharyngeal    Specimen: Nasopharyngeal; Swab   Result Value Ref Range    Influenza A PCR Negative Negative    Influenza B PCR Negative Negative    RSV PCR Negative Negative    SARS CoV2 PCR Negative Negative    Narrative    Testing was performed using the Xpert Xpress CoV2/Flu/RSV Assay on the [employer] GeneXpert Instrument. This test should be ordered for the detection of SARS-CoV-2, influenza, and RSV viruses in individuals who meet clinical and/or epidemiological criteria. Test performance is unknown in asymptomatic patients. This test is for in vitro diagnostic use under the FDA EUA for laboratories certified under CLIA to perform high or moderate complexity testing. This test has not been FDA cleared or approved. A negative result does not rule out the presence of PCR inhibitors in the specimen or target RNA in concentration below the limit of detection for the assay. If only one  viral target is positive but coinfection with multiple targets is suspected, the sample should be re-tested with another FDA cleared, approved, or authorized test, if coinfection would change clinical management. This test was validated by the [facility]. These laboratories are certified under the Clinical Laboratory Improvement Amendments of 1988 (CLIA-88) as qualified to perform high complexity laboratory testing.   US Lower Extremity Venous Duplex Bilateral    Narrative    EXAM: US LOWER EXTREMITY VENOUS DUPLEX BILATERAL  LOCATION: [facility]  DATE: 11/7/2023    INDICATION: extensive bilateral PE with pulmonary infarct.  Evaluate clot burden  COMPARISON: None.  TECHNIQUE: Venous Duplex ultrasound of bilateral lower extremities with and without compression, augmentation and duplex. Color flow and spectral Doppler with waveform analysis performed.    FINDINGS: Exam includes the common femoral, femoral, popliteal veins as well as segmentally visualized deep calf veins and greater saphenous vein.     RIGHT: No deep vein thrombosis. No superficial thrombophlebitis. No popliteal cyst.    LEFT: There is partially occlusive DVT in the left popliteal vein. No superficial thrombophlebitis. No popliteal cyst.      Impression    IMPRESSION:  1.  Partially occlusive DVT in the left popliteal vein.   Basic metabolic panel   Result Value Ref Range    Sodium 132 (L) 135 - 145 mmol/L    Potassium 4.1 3.4 - 5.3 mmol/L    Chloride 100 98 - 107 mmol/L    Carbon Dioxide (CO2) 21 (L) 22 - 29 mmol/L    Anion Gap 11 7 - 15 mmol/L    Urea Nitrogen 19.0 8.0 - 23.0 mg/dL    Creatinine 0.98 0.67 - 1.17 mg/dL    GFR Estimate 89 >60 mL/min/1.73m2    Calcium 9.2 8.6 - 10.0 mg/dL    Glucose 99 70 - 99 mg/dL   CBC with platelets   Result Value Ref Range    WBC Count 9.3 4.0 - 11.0 10e3/uL    RBC Count 4.35 (L) 4.40 - 5.90 10e6/uL    Hemoglobin 13.4 13.3 - 17.7 g/dL    Hematocrit 40.4 40.0 - 53.0 %     MCV 93 78 - 100 fL    MCH 30.8 26.5 - 33.0 pg    MCHC 33.2 31.5 - 36.5 g/dL    RDW 11.9 10.0 - 15.0 %    Platelet Count 215 150 - 450 10e3/uL   Echocardiogram Complete   Result Value Ref Range    LVEF  60-65%     Narrative    [id number]  [id number]  [id number]  [id number]^BRIAN^LIZET     [facility]  Echocardiography Laboratory  [address]  [place] [id number]     Name: DREAD MANDUJANO  MRN: [id number]  : 1965  Study Date: 2023 01:44 PM  Age: 58 yrs  Gender: Male  Patient Location: [facility]  Reason For Study: Syncope  Ordering Physician: LIZET TORRES  Referring Physician: Hermann Hawthorne  Performed By: Nahomi Randall RDCS     BSA: 2.2 m2  Height: 73 in  Weight: 218 lb  HR: 105  BP: 121/75 mmHg  ______________________________________________________________________________  Procedure  Complete Echo Adult. Optison (NDC #[id number]) given intravenously.  ______________________________________________________________________________  Interpretation Summary     1. Normal left ventricular size and function. Left ventricular ejection  fraction of 60-65%.  2. No segmental wall motion abnormalities noted.  3. RV is poorly seen but appears mildly dilated on limited views.  4. No hemodynamically significant valvular disease.  No prior study for comparison.  ______________________________________________________________________________  Left Ventricle  The left ventricle is normal in size. There is normal left ventricular wall  thickness. Left ventricular systolic function is normal. Grade I or early  diastolic dysfunction. The visual ejection fraction is 60-65%. No regional  wall motion abnormalities noted.     Right Ventricle  The right ventricle is normal in size and function.     Atria  Normal left atrial size. Right atrial size is normal. There is no color  Doppler evidence of an atrial shunt.     Mitral Valve  The mitral valve leaflets appear normal. There is no evidence of  stenosis,  fluttering, or prolapse. There is trace mitral regurgitation. There is no  mitral valve stenosis.     Tricuspid Valve  Normal tricuspid valve. There is trace tricuspid regurgitation.     Aortic Valve  The aortic valve is trileaflet. No aortic regurgitation is present. No aortic  stenosis is present.     Pulmonic Valve  The pulmonic valve is not well visualized. There is trace pulmonic valvular  regurgitation.     Vessels  The aortic root is normal size. Normal size ascending aorta. The inferior vena  cava is normal.     Pericardium  There is no pericardial effusion.     Rhythm  Sinus rhythm was noted.  ______________________________________________________________________________  MMode/2D Measurements & Calculations  IVSd: 0.94 cm     LVIDd: 4.7 cm  LVIDs: 2.9 cm  LVPWd: 0.95 cm  FS: 38.0 %  LV mass(C)d: 153.3 grams  LV mass(C)dI: 68.7 grams/m2  Ao root diam: 3.7 cm  LA dimension: 3.6 cm  asc Aorta Diam: 3.6 cm  LA/Ao: 0.97  Ao root diam index Ht(cm/m): 2.0  Ao root diam index BSA (cm/m2): 1.7  Asc Ao diam index BSA (cm/m2): 1.6  Asc Ao diam index Ht(cm/m): 1.9  LA Volume (BP): 29.8 ml     LA Volume Index (BP): 13.4 ml/m2  RV Base: 2.0 cm  RWT: 0.40  TAPSE: 1.9 cm     Doppler Measurements & Calculations  MV E max alexander: 58.5 cm/sec  MV A max alexander: 73.2 cm/sec  MV E/A: 0.80  MV dec time: 0.15 sec  PA acc time: 0.09 sec  E/E' av.9  Lateral E/e': 6.4  Medial E/e': 7.5  RV S Alexander: 15.7 cm/sec     ______________________________________________________________________________  Report approved by: Isak Abreu 2023 02:39 PM               Condition on Discharge:   -Good    Discharge Medications:      Current Discharge Medication List        START taking these medications    Details   apixaban ANTICOAGULANT (ELIQUIS) 5 MG tablet Take 2 tablets (10 mg) by mouth 2 times daily for 6 days, THEN 1 tablet (5 mg) 2 times daily for 90 days.  Qty: 204 tablet, Refills: 0    Associated Diagnoses: Bilateral  pulmonary embolism (H); Pulmonary infarction (H)           CONTINUE these medications which have NOT CHANGED    Details   cyclobenzaprine (FLEXERIL) 10 MG tablet Take 10 mg by mouth 3 times daily as needed for muscle spasms      lisinopril (ZESTRIL) 20 MG tablet Take 1 tablet by mouth daily      Multiple Vitamin (ONE-A-DAY ESSENTIAL) TABS Take 1 tablet by mouth daily             Discharge Instructions:        Reason for your hospital stay    Clot in your left leg and lungs     Follow-up and recommended labs and tests     Follow up with your PCP as you are scheduled this week. You will need labs done called hypercoagulable panel at your PCP appointment. Please also discuss whether or not you need any additional testing as part of routine cancer screening with your PCP at your follow up appointment.     Activity    Your activity upon discharge: activity as tolerated     Discharge Instructions    Please ensure that all medications are taken as prescribed. Please follow the instructions on the eliquis very closely as the dose changes over time. Please avoid taking any over the counter pain medications in the class called NSAIDs (ibuprofen, aleve, motrin, naproxen, advil, aspirin, etc) as these can increase your risk of bleeding if taken with eliquis. Tylenol is okay to take if needed for pain/fever. You will need to be on eliquis for a minimum of 3 months, but this may change based on the labs that your PCP will need to send to see if you have an underlying clotting condition. If you develop intractable pain / vomiting, shortness of breath, see blood in your stool, pass black stool, or if you have any other concerning symptoms, please seek medical attention.     Diet    Follow this diet upon discharge: Cardiac (low fat, low salt)        Dispo:   -Home    Recommended PCP Follow Up:   -Needs hypercoagulable labs  -Please ensure patient is UTD on his routine cancer screening      Signed:     Valencia Han MD  11/8/2023  3:08 PM      34 minutes was spent evaluating the patient, writing orders, discharge planning, and providing patient discharge education and counseling in preparation for discharge.

## 2023-11-08 NOTE — MEDICATION SCRIBE - ADMISSION MEDICATION HISTORY
Medication Scribe Admission Medication History    Admission medication history is complete. The information provided in this note is only as accurate as the sources available at the time of the update.    Information Source(s): Patient and CareEverywhere/SureScripts via  in room with patient and finished at desk.    Pertinent Information: All meds on PTA med list are new to chart note.    Had Cyclobenzaprine leftover at home from previous injury.  Was on Pravastatin in the past.  States that he has tried a few different ones but they have all given him a headache.    Changes made to PTA medication list:  Added: Cyclobenzaprine, Lisinopril 20 mg, MVI.  Deleted: None  Changed: None    Medication Affordability:  Not including over the counter (OTC) medications, was there a time in the past 3 months when you did not take your medications as prescribed because of cost?: No    Allergies reviewed with patient and updates made in EHR: yes, added Pravastatin    Medication History Completed By: Katey Acosta 11/7/2023 6:30 PM    PTA Med List   Medication Sig Last Dose    cyclobenzaprine (FLEXERIL) 10 MG tablet Take 10 mg by mouth 3 times daily as needed for muscle spasms 11/7/2023 at am    lisinopril (ZESTRIL) 20 MG tablet Take 1 tablet by mouth daily 11/7/2023 at am    Multiple Vitamin (ONE-A-DAY ESSENTIAL) TABS Take 1 tablet by mouth daily 11/7/2023 at am

## 2023-11-08 NOTE — H&P
[facility]    History and Physical - Hospitalist Service       Date of Admission:  11/7/2023    Assessment & Plan      Chano Mott is a 58 year old male admitted on 11/7/2023. He has a past medical history of hypertension, hyperlipidemia, lumbar radiculopathy, and ankylosing spondylitis who presented with shortness of breath, shoulder pain, and hemoptysis.  Patient was found to have a bilateral pulmonary embolisms with developing pulmonary infarct.    Acute bilateral pulmonary embolism, moderate risk  Evolving pulmonary infarct  DVT, provoked  Patient with history of hemoptysis, periscapular pain, and shortness of breath now with evidence of bilateral pulmonary embolism on CT chest. PESI score (89 pts) places patient in intermediate risk category (3.2-7.1% 30-day mortality) with recommendation of inpatient monitoring. Patient remains hemodynamically stable on admission. Patient initially tachycardic (120's BPM), no hypoxia on room air, normotensive, and troponin negative. Likely provoked from recent dental procedure 11/02 so will require at minimum 3 months of anticoagulation and re-evaluation. US LE indicates partially occlusive left popliteal DVT.  Given intermediate score and tachycardia will admit for continued cardiac monitoring.    CT chest: Occlusive embolism within the segmental pulmonary arterial branch in the right upper lobe with large evolving infarct within the posterior right upper lobe. There is also evidence of embolism within a subsegmental   pulmonary arterial branch within the inferior left upper lobe with smaller evolving subpleural infarct. Multiple emboli within the right lower lobe including the right lower lobe pulmonary artery. Small amount of embolism seen in the right upper lobe pulmonary artery extends into the right pulmonary artery.    - Initiated Apixaban 10 mg twice daily for 7 days, then 5 mg twice daily for at least 3 months.   - There is no  "standardized recommendation for repeat CT scanning to evaluate for resolution of PI and depending on patient's progression/resolution of symptoms will dictate if repeat CT scan is indicated.    - TTE ordered  - Continuous telemetry   - Vitals per unit routine  - LR at 100 ml/hr    Acute kidney injury, KDIGO stage I  Patient's creatinine on admission 1.32.  Previous baseline ~0.9.  Etiology possibly related to recent NSAID usage for shoulder pain.  - LR at 100ml/hr  - Avoid nephrotoxic medications  - Repeat BMP in the morning    Hyperlipidemia  Patient not on any PTA medications.  Lipid panel (03/23): Cholesterol 231, , and HDL 61.  ASCVD risk score 10.5%.  Patient would benefit from moderate- to high-intensity statin because 10-year risk is greater than 7.5%.  -We will defer to patient's PCP for initiation of statin therapy.    Hypertension  Patient managed on 20 mg lisinopril.  -We will plan to hold lisinopril given bilateral PEs and GERRY.  Will resume prior to discharge.        Diet:  Regular diet  DVT Prophylaxis: DOAC  Paredes Catheter: Not present  Lines: None     Cardiac Monitoring: None  Code Status:  Full code    Clinically Significant Risk Factors Present on Admission                  # Hypertension: Noted on problem list      # Overweight: Estimated body mass index is 29.29 kg/m  as calculated from the following:    Height as of this encounter: 1.854 m (6' 1\").    Weight as of this encounter: 100.7 kg (222 lb).              Disposition Plan      Expected Discharge Date: 11/08/2023                  Jordan Cabrera DO  Hospitalist Service  [facility]  Securely message with [employer] (more info)  Text page via [employer] Paging/Directory     ______________________________________________________________________    Chief Complaint   \"Shortness of breath, right shoulder pain, hemoptysis\"    History is obtained from the patient    History of Present Illness   Chano Mott is a 58 year " old male who has past medical history of hypertension, hyperlipidemia, lumbar radiculopathy, and ankylosing spondylitis who presents with new onset hemoptysis, shortness of breath, and right shoulder pain.  He recently underwent a molar extraction 11/2 which healed without any concerns, but then patient noticed that he was still coughing up blood which prompted him to be concerned.  Patient also noted that he had right scapular pain which was sharp and worse with exertion.  Patient noted that he started to have a cough that made the pain worse and after quick Google search decided that it was time to be evaluated in the ED.  Of note, patient is fairly active at baseline without any comorbidities.  Patient became more concerned when he was short of breath, because this is brand-new and he typically has pretty good exercise tolerance.  He denies any smoking, call, or illicit drug use.    Past Medical History    No past medical history on file.    Past Surgical History   No past surgical history on file.    Prior to Admission Medications   Prior to Admission Medications   Prescriptions Last Dose Informant Patient Reported? Taking?   Multiple Vitamin (ONE-A-DAY ESSENTIAL) TABS 11/7/2023 at am Self Yes Yes   Sig: Take 1 tablet by mouth daily   cyclobenzaprine (FLEXERIL) 10 MG tablet 11/7/2023 at am Self Yes Yes   Sig: Take 10 mg by mouth 3 times daily as needed for muscle spasms   lisinopril (ZESTRIL) 20 MG tablet 11/7/2023 at am Self Yes Yes   Sig: Take 1 tablet by mouth daily      Facility-Administered Medications: None        Review of Systems    The 10 point Review of Systems is negative other than noted in the HPI or here.      Physical Exam   Vital Signs: Temp: 97.8  F (36.6  C) Temp src: Tympanic BP: (!) 140/91 Pulse: 94   Resp: 20 SpO2: 90 % O2 Device: None (Room air)    Weight: 222 lbs 0 oz    Constitutional: awake, alert, cooperative, no apparent distress, and appears stated age  Respiratory: No increased work  of breathing, good air exchange, clear to auscultation bilaterally, no crackles or wheezing  Cardiovascular: Tachycardia, regular rate and rhythm, no murmurs, no rubs  GI: No scars, normal bowel sounds, soft, non-distended, non-tender, no masses palpated, no hepatosplenomegally  Skin: normal skin color, texture, turgor    Medical Decision Making       65 MINUTES SPENT BY ME on the date of service doing chart review, history, exam, documentation & further activities per the note.      Data     I have personally reviewed the following data over the past 24 hrs:    9.1  \   14.5   / 207     135 100 20.5 /  104 (H)   4.4 24 1.32 (H) \     ALT: 36 AST: 20 AP: 72 TBILI: 0.6   ALB: 3.9 TOT PROTEIN: 7.8 LIPASE: N/A     Trop: 8 BNP: N/A     INR:  1.13 PTT:  23   D-dimer:  N/A Fibrinogen:  N/A       Imaging results reviewed over the past 24 hrs:   Recent Results (from the past 24 hour(s))   CT Chest Pulmonary Embolism w Contrast    Narrative    EXAM: CT CHEST PULMONARY EMBOLISM W CONTRAST  LOCATION: [facility]  DATE: 11/7/2023    INDICATION: Pain. Shortness of breath, right periscapular pain, recent surgical procedure, rule out pulmonary embolism.  COMPARISON: None.  TECHNIQUE: CT chest pulmonary angiogram during arterial phase injection of IV contrast. Multiplanar reformats and MIP reconstructions were performed. Dose reduction techniques were used.   CONTRAST: 93 ml Isovue 370    FINDINGS:  ANGIOGRAM CHEST: Multiple bilateral pulmonary emboli with occlusive embolus within the posterior right upper lobe with large evolving infarct. This also infarcts in the inferior left upper lobe. Multiple emboli in the right lower lobe, including the   right lower lobe pulmonary artery. No right heart strain. Thoracic aorta negative for dissection or aneurysm.    LUNGS AND PLEURA: Large subpleural ground-glass infiltrate right upper lobe compatible with evolving infarct. Ground-glass and solid subpleural  infiltrate in the inferomedial left upper lobe compatible with infarct. Tiny effusions. Elevation right   hemidiaphragm.    MEDIASTINUM/AXILLAE: Moderate.    CORONARY ARTERY CALCIFICATION: Moderate.    UPPER ABDOMEN: A few visualized simple cysts left kidney which do not warrant follow-up.    MUSCULOSKELETAL: Normal.      Impression    IMPRESSION:  1.  Acute bilateral pulmonary embolism. Occlusive embolism within the segmental pulmonary arterial branch in the right upper lobe with large evolving infarct within the posterior right upper lobe. There is also evidence of embolism within a subsegmental   pulmonary arterial branch within the inferior left upper lobe with smaller evolving subpleural infarct. Multiple emboli within the right lower lobe including the right lower lobe pulmonary artery. Small amount of embolism seen in the right upper lobe   pulmonary artery extends into the right pulmonary artery.    2.  Results discussed with Dr. Stanley Hedrick.    NOTE: ABNORMAL REPORT    1.  THE DICTATION ABOVE DESCRIBES AN ABNORMALITY FOR WHICH FOLLOW-UP IS NEEDED.    US Lower Extremity Venous Duplex Bilateral    Narrative    EXAM: US LOWER EXTREMITY VENOUS DUPLEX BILATERAL  LOCATION: [facility]  DATE: 11/7/2023    INDICATION: extensive bilateral PE with pulmonary infarct.  Evaluate clot burden  COMPARISON: None.  TECHNIQUE: Venous Duplex ultrasound of bilateral lower extremities with and without compression, augmentation and duplex. Color flow and spectral Doppler with waveform analysis performed.    FINDINGS: Exam includes the common femoral, femoral, popliteal veins as well as segmentally visualized deep calf veins and greater saphenous vein.     RIGHT: No deep vein thrombosis. No superficial thrombophlebitis. No popliteal cyst.    LEFT: There is partially occlusive DVT in the left popliteal vein. No superficial thrombophlebitis. No popliteal cyst.      Impression    IMPRESSION:  1.   Partially occlusive DVT in the left popliteal vein.

## 2023-11-08 NOTE — PLAN OF CARE
"  Problem: Adult Inpatient Plan of Care  Goal: Optimal Comfort and Wellbeing  Outcome: Progressing     Problem: Gas Exchange Impaired  Goal: Optimal Gas Exchange  Outcome: Progressing   Goal Outcome Evaluation:             Pt on room air, SOA with exertion, however less today per pt report than yesterday, pain in R upper chest and shoulder has resolved, ambulated 300 ft plus on room air, with O2 in low 90's, I/S being used by patient with good technique able to hit 2000, Denies pain, and able to make needs known, independent in room. Tolerating oral intake well denies N/V, /78   Pulse 105   Temp 99.9  F (37.7  C) (Oral)   Resp 16   Ht 1.854 m (6' 1\")   Wt 99.3 kg (218 lb 14.7 oz)   SpO2 92%   BMI 28.88 kg/m              "

## 2023-11-08 NOTE — PROGRESS NOTES
"[facility] [facility] [place]MISSION NOTE    Patient admitted to room 2211 at approximately 1930 via wheel chair from emergency room. Patient was accompanied by nurse.     Verbal SBAR report received from Lissa prior to patient arrival.     Patient ambulated to bed independently. Patient alert and oriented X 4. The patient is not having any pain.  . Admission vital signs: Blood pressure 130/82, pulse 102, temperature 98.3  F (36.8  C), temperature source Oral, resp. rate 19, height 1.854 m (6' 1\"), weight 99.3 kg (218 lb 14.7 oz), SpO2 95%. Patient was oriented to plan of care, call light, bed controls, tv, telephone, bathroom, and visiting hours.     Risk Assessment    The following safety risks were identified during admission: none. Yellow risk band applied: NO.     Skin Initial Assessment    This writer admitted this patient and completed a full skin assessment and Boris score in the Adult PCS flowsheet. Appropriate interventions initiated as needed.     Secondary skin check completed by Heather JEROME         Education    Patient has a [place] to Observation order: Yes  Observation education completed and documented: Yes      Renée Mariano RN      "

## 2023-11-08 NOTE — PROGRESS NOTES
Patient has been assessed for Home Oxygen needs. Oxygen readings:    *Pulse oximetry (SpO2) = 92% on room air at rest while awake.    *SpO2 = 90% on room air during activity/with exercise.         Pt was able to ambulate 300 feet on room air, no dizziness, no chest pain, slight SOA with exertion, per pt report.  at highest during ambulation with O2 holding at 90%      Lissa Hernandez RN on 11/8/2023 at 10:27 AM

## 2023-11-08 NOTE — PROGRESS NOTES
Pt has tele ordered, at this time no tele monitors available.  Lissa Hernandez RN on 11/8/2023 at 7:12 AM

## 2023-11-08 NOTE — PLAN OF CARE
Problem: Adult Inpatient Plan of Care  Goal: Optimal Comfort and Wellbeing  Outcome: Progressing     Problem: Gas Exchange Impaired  Goal: Optimal Gas Exchange  Intervention: Optimize Oxygenation and Ventilation  Flowsheets (Taken 11/8/2023 0152)  Airway/Ventilation Management: airway patency maintained  Head of Bed (HOB) Positioning: HOB at 45 degrees    Goal Outcome Evaluation: Pt alert and oriented x4. Pt independent in room and denies any pain. Pt HOB elevated to decrease shortness of breath. Pt on room air and O2 sats above 90%. Lactated Ringer's running at 100ml/hr. Pt slept throughout shift. Pt up to void independently in the bathroom.

## 2023-11-08 NOTE — PROGRESS NOTES
Skin affirmation note    Admitting nurse completed full skin assessment, Boris score and Boris interventions. This writer agrees with the initial skin assessment findings.

## 2024-06-29 ENCOUNTER — HEALTH MAINTENANCE LETTER (OUTPATIENT)
Age: 59
End: 2024-06-29

## 2025-02-18 ENCOUNTER — TELEPHONE (OUTPATIENT)
Dept: UROLOGY | Facility: CLINIC | Age: 60
End: 2025-02-18
Payer: COMMERCIAL

## 2025-02-18 NOTE — TELEPHONE ENCOUNTER
Patient's Full Name  Chano Mott  Patient's Date of Birth  1965  Patient's Phone Number  [phone]  Patient's Email ID  [email or web address]  Has the patient visited [place] in the past 3 years?  Yes  Address Details  Address  [address] [place], [place]  [address]  Appointment Preferences  Reason for appointment  Dr. Hermann Hawthorne gave me a referal. Father passed of Prostate Cancer. Symptoms for me are frequent trips to the bathroom at night...PSA %, Free is 22% that is also of concern.  Preferred Provider  Dano Rand  Preferred Location  [place]   Preferred Visit Type  In-person   Services   Do you need an  for your appointment?  No  Billing Preference  Which billing situation applies to the patient?  Patient has insurance  Insurance Information  Insurance Provider Name  [facility]  Member ID/ Policy Number  [id number]  Group Number  [id number]  Insurance Contact for Provider  [phone]  Policy Yang  Is the patient the Insurance Policy yang/subscriber?  Yes, patient is the policy yang  Callback Preferences  Could you share your preferred callback time with us?  Yes, I have a preferred callback time  Preferred Time  Tuesday - Morning (8:00 AM to Noon)  Another Time  Wednesday - Morning (8:00 AM to Noon)    
[employer]

## 2025-07-13 ENCOUNTER — HEALTH MAINTENANCE LETTER (OUTPATIENT)
Age: 60
End: 2025-07-13